# Patient Record
Sex: MALE | Race: WHITE | Employment: FULL TIME | URBAN - METROPOLITAN AREA
[De-identification: names, ages, dates, MRNs, and addresses within clinical notes are randomized per-mention and may not be internally consistent; named-entity substitution may affect disease eponyms.]

---

## 2019-04-18 ENCOUNTER — OFFICE VISIT (OUTPATIENT)
Dept: FAMILY MEDICINE | Facility: CLINIC | Age: 44
End: 2019-04-18
Payer: COMMERCIAL

## 2019-04-18 VITALS
RESPIRATION RATE: 16 BRPM | DIASTOLIC BLOOD PRESSURE: 90 MMHG | TEMPERATURE: 98.4 F | WEIGHT: 224.5 LBS | SYSTOLIC BLOOD PRESSURE: 122 MMHG | HEART RATE: 70 BPM | BODY MASS INDEX: 30.41 KG/M2 | HEIGHT: 72 IN | OXYGEN SATURATION: 97 %

## 2019-04-18 DIAGNOSIS — F17.200 TOBACCO DEPENDENCE SYNDROME: ICD-10-CM

## 2019-04-18 DIAGNOSIS — Z13.6 CARDIOVASCULAR SCREENING; LDL GOAL LESS THAN 160: ICD-10-CM

## 2019-04-18 DIAGNOSIS — J06.9 VIRAL UPPER RESPIRATORY TRACT INFECTION: Primary | ICD-10-CM

## 2019-04-18 DIAGNOSIS — R05.9 COUGH: ICD-10-CM

## 2019-04-18 PROBLEM — B35.1 ONYCHOMYCOSIS: Status: ACTIVE | Noted: 2017-03-16

## 2019-04-18 PROCEDURE — 99203 OFFICE O/P NEW LOW 30 MIN: CPT | Performed by: FAMILY MEDICINE

## 2019-04-18 RX ORDER — NAPROXEN SODIUM 220 MG
220 TABLET ORAL 2 TIMES DAILY WITH MEALS
COMMUNITY

## 2019-04-18 RX ORDER — BENZONATATE 100 MG/1
100 CAPSULE ORAL 3 TIMES DAILY PRN
Qty: 20 CAPSULE | Refills: 0 | Status: SHIPPED | OUTPATIENT
Start: 2019-04-18

## 2019-04-18 RX ORDER — ALBUTEROL SULFATE 90 UG/1
2 AEROSOL, METERED RESPIRATORY (INHALATION) EVERY 4 HOURS PRN
Qty: 18 G | Refills: 1 | Status: SHIPPED | OUTPATIENT
Start: 2019-04-18

## 2019-04-18 RX ORDER — CHLORAL HYDRATE 500 MG
1 CAPSULE ORAL
COMMUNITY

## 2019-04-18 ASSESSMENT — MIFFLIN-ST. JEOR: SCORE: 1933.39

## 2019-04-18 NOTE — PATIENT INSTRUCTIONS
Alternative Therapies: these have been shown to shorten the course of illness    Preparation Dosing Duration of treatment   Treatment   Andrographis paniculata ([place]) 200 mg daily Five days   Echinacea purpurea (solution of pressed juice of aerial parts and alcohol) 4 mL twice daily Eight weeks    20 drops every two hours on day 1, then 20 drops three times daily 10 days   Pelargonium sidoides (geranium) extract ([facility]) 30 drops three times daily, alcohol root extract 10 days   Zinc acetate or gluconate Variable (lozenges contain between 4.5 and 23.7 mg of zinc) As long as symptoms persist   Prevention   Garlic Supplement with 180 mg of allicin 12 weeks   Vitamin C 0.25 to 2 g daily 40 days to 28 weeks (generally around three months)     Prescription medications/Over the counter medications  Decongestants with or without antihistamines (pseudoephedrine).  Naprosyn (Aleve)  375 or 500 mg two times per day      Prevention:  Wash your hands often!  Daily vitamin C

## 2019-04-18 NOTE — PROGRESS NOTES
"  SUBJECTIVE:   Flakito Thompson is a 43 year old male who presents to clinic today for the following   health issues:    RESPIRATORY SYMPTOMS      Duration: 3 days    Description:  Bilateral nasal congestion maxillary sinus pressure, rhinorrhea, cough, wheezing, increased activity intolerance with increased coughing with exercise. Patient tried to run on the treadmill yesterday and was unable to tolerate due to cough. Cough tight and intermittently productive with yellow/green sputum. Reports fatigue/malaise without fever/chills. Sore throat present for first 2 days of illness but has resolved at this time. Denies shortness of breath at rest, headache, drainage from eyes, hearing change/loss, difficulty sleeping    Severity: moderate    Accompanying signs and symptoms: ear pain bilaterally with more pain in right ear. Right ear had thin clear and bloody drainage from the ear canal 2 days ago but denies recent discharge. Frequently cleans ear with q-tip and per pin and patient states he may have scratched the ear canal which could have caused the bleeding. Ears feel \"plugged.\"    History (predisposing factors):  Had walking pneumonia a few years ago    Precipitating or alleviating factors: None    Therapies tried and outcome:  oral decongestant, nyquil that minimally helped. Denies usre of neti pot or saline sprays.    Patient reports that daughter was sick with similar illness a couple weeks ago and has recovered without intervention. Patient has had pneumonia in the past and reports intention to be examined to prevent pneumonia from happening again.         Additional history: as documented  Reviewed  and updated as needed this visit by clinical staff  Tobacco  Allergies  Meds  Med Hx  Surg Hx  Fam Hx  Soc Hx        Reviewed and updated as needed this visit by Provider  Tobacco  Med Hx  Surg Hx  Fam Hx  Soc Hx        Patient Active Problem List   Diagnosis     Left knee DJD     Onychomycosis     " "Pityriasis versicolor     Snoring     Family history of colon cancer     Tobacco dependence syndrome     Viral upper respiratory tract infection     CARDIOVASCULAR SCREENING; LDL GOAL LESS THAN 160     Past Surgical History:   Procedure Laterality Date     C TOTAL KNEE ARTHROPLASTY Left 2014     HC TOOTH EXTRACTION W/FORCEP         Social History     Tobacco Use     Smoking status: Never Smoker     Smokeless tobacco: Current User     Types: Chew   Substance Use Topics     Alcohol use: Yes     Alcohol/week: 3.0 oz     Types: 5 Standard drinks or equivalent per week     Family History   Problem Relation Age of Onset     Hypertension Mother      Hypertension Brother      Cerebrovascular Disease Paternal Grandmother      Seizure Disorder Son      Lupus Paternal Grandfather      Alcoholism Other      Colon Cancer Other          Current Outpatient Medications   Medication Sig Dispense Refill     albuterol (PROAIR HFA) 108 (90 Base) MCG/ACT inhaler Inhale 2 puffs into the lungs every 4 hours as needed for shortness of breath / dyspnea or wheezing 18 g 1     benzonatate (TESSALON) 100 MG capsule Take 1 capsule (100 mg) by mouth 3 times daily as needed for cough 20 capsule 0     fish oil-omega-3 fatty acids 1000 MG capsule Take 1 tablet by mouth       MULTIPLE VITAMIN PO Take 1 tablet by mouth       naproxen sodium (ANAPROX) 220 MG tablet Take 220 mg by mouth 2 times daily (with meals)         ROS:  Constitutional, HEENT, cardiovascular, pulmonary, gi and gu systems are negative, except as otherwise noted.    OBJECTIVE:     /90   Pulse 70   Temp 98.4  F (36.9  C) (Oral)   Resp 16   Ht 1.816 m (5' 11.5\")   Wt 101.8 kg (224 lb 8 oz)   SpO2 97%   BMI 30.88 kg/m    Body mass index is 30.88 kg/m .  GENERAL: healthy, alert and no distress  EYES: Eyes grossly normal to inspection, PERRL and conjunctivae and sclerae normal  HENT: ear canals clear of lesions or ear wax. No abrasions, lesions, or drainage noted in " "bilateral ears. TMs normal, nose and mouth without ulcers or lesions. Tender maxillary sinuses.   NECK: no adenopathy, no asymmetry, masses, or scars and thyroid normal to palpation  RESP: Expiratory wheeze noted in right lower lung base with tight cough on deep inspiration. no rales or rhonchi  CV: regular rate and rhythm, normal S1 S2, no S3 or S4, no murmur, click or rub, no peripheral edema and peripheral pulses strong  MS: no gross musculoskeletal defects noted, no edema  SKIN: no suspicious lesions or rashes  NEURO: Normal strength and tone, mentation intact and speech normal  PSYCH: mentation appears normal, affect normal/bright    Diagnostic Test Results:  none     ASSESSMENT/PLAN:     1. Viral upper respiratory tract infection  2. Cough  No evidence of pneumonia or other possible bacterial infections today  - discussed safe use use of PRN nasal decongestants  - benzonatate (TESSALON) 100 MG capsule; Take 1 capsule (100 mg) by mouth 3 times daily as needed for cough  Dispense: 20 capsule; Refill: 0  - albuterol (PROAIR HFA) 108 (90 Base) MCG/ACT inhaler; Inhale 2 puffs into the lungs every 4 hours as needed for shortness of breath / dyspnea or wheezing  Dispense: 18 g; Refill: 1  Patient reports he has used albuterol for bronchitis in the past. Denies questions on use.    3. Tobacco dependence syndrome  chew  - discussed quitting. Patient reports he is currently trying to quit by cutting down on current use. Denies desire to quit \"cold turkey\" and hopes to quit tobacco use slowly. States intention to try bupropion if unable to successfully quit on his own. Declines further interventions at this time.     4. CARDIOVASCULAR SCREENING; LDL GOAL LESS THAN 160  Visit blood pressures: /96 with BP recheck 122/90  - discussed healthy blood pressure levels. Patient states he had elevated blood pressures due to chronic pain prior to his total knee replacement with resolution of elevated blood pressures after the " surgery. Patient reports drinking several glasses of wine last night and several caffeinated beverages this AM which may have contributed to elevated blood pressure.     Return to clinic for CPE and blood pressure recheck in 3-6 months        See Patient Instructions    Iesha Kingsley MD  [facility]

## 2019-08-27 ENCOUNTER — RECORDS - HEALTHEAST (OUTPATIENT)
Dept: ADMINISTRATIVE | Facility: OTHER | Age: 44
End: 2019-08-27